# Patient Record
Sex: FEMALE | Race: WHITE | NOT HISPANIC OR LATINO
[De-identification: names, ages, dates, MRNs, and addresses within clinical notes are randomized per-mention and may not be internally consistent; named-entity substitution may affect disease eponyms.]

---

## 2023-09-24 ENCOUNTER — NON-APPOINTMENT (OUTPATIENT)
Age: 23
End: 2023-09-24

## 2024-04-09 PROBLEM — Z00.00 ENCOUNTER FOR PREVENTIVE HEALTH EXAMINATION: Status: ACTIVE | Noted: 2024-04-09

## 2024-04-16 ENCOUNTER — APPOINTMENT (OUTPATIENT)
Dept: COLORECTAL SURGERY | Facility: CLINIC | Age: 24
End: 2024-04-16
Payer: COMMERCIAL

## 2024-04-16 VITALS
HEIGHT: 65 IN | DIASTOLIC BLOOD PRESSURE: 69 MMHG | WEIGHT: 201.4 LBS | SYSTOLIC BLOOD PRESSURE: 109 MMHG | OXYGEN SATURATION: 98 % | HEART RATE: 91 BPM | BODY MASS INDEX: 33.55 KG/M2

## 2024-04-16 PROCEDURE — 99203 OFFICE O/P NEW LOW 30 MIN: CPT | Mod: 25

## 2024-04-16 PROCEDURE — 46600 DIAGNOSTIC ANOSCOPY SPX: CPT

## 2024-04-16 NOTE — ASSESSMENT
[FreeTextEntry1] : Perirectal abscess, spontaneously drained on own.  No signs of infection on current examination. First episode of perirectal abscess. Natural history reviewed with patient and possibility of associated anal fistula discussed. Signs/symptoms of abscess reviewed with patient. If occur, recommend patient call office for evaluation or go to nearest ED. Recommend sitz baths. Discussed further evaluation with MRI or EUA to assess for fistula if symptoms persist.  Recommend f/u 2-3 weeks to reassess, or sooner as needed.  All questions were answered, patient expressed understanding, and is agreeable to this plan.

## 2024-04-16 NOTE — PHYSICAL EXAM
[FreeTextEntry1] : Medical assistant present for duration of physical examination   General no acute distress, alert and oriented Psych calm, pleasant demeanor, responding appropriately to questions Nonlabored breathing Ambulating without assistance Skin normal color and pigment    Anorectal Exam: Inspection no erythema, induration or fluctuance, left anterior quadrant perianal wound with scant serous drainage,  no fissure, minimal external hemorrhoids without acute inflammation or thrombosis FAIZA nontender, no masses palpated, no blood on gloved finger, no fluctuance   Procedure: Anoscopy   Pre procedure Diagnosis: h/o perirectal abscess, rule out anal fistula Post procedure Diagnosis: h/o perirectal abscess, rule out anal fistula Anesthesia: none Estimated blood loss: none Specimen: none Complications: none   Consent obtained. Anoscopy was performed by passing a lighted anoscope with lubricant jelly into the anal canal and the entire anal mucosal surface was inspected. Findings included mild internal hemorrhoids, no blood or discharge in anal canal   Patient tolerated examination and procedure well.

## 2024-04-16 NOTE — HISTORY OF PRESENT ILLNESS
[FreeTextEntry1] : 22 y/o F presents for initial consultation  Referred by GI Dr. Janett Alvarado   Denies PMH Denies taking medications PSH - Ear tubes  Denies family hx of CRC/IBD  Allergies: PCN (rash)  No prior history of colonoscopy.  Outside records reviewed: Patient seen by GI on 04/09/2024 for c/o drainage and bleeding from L buttock.  Patient reported ~1 month ago had norovirus and menses after that passed developed painful bump in left buttock. Symptoms resolved until more recently had pain again and noticed bleeding. Exam noted: L perianal drainage around 3cm from the anal opening, evidence of prior ?hematoma/abscess. Referred to CRS for possible fistulous opening.   Reports lump on inside of left buttocks has resolved, but still feels a bit of firmness near the opening. Has daily bloody discharge noted on pad  Denies h/o fever, chills, abd pain, cramping or odorous, colored drainage Denies h/o abscesses elsewhere on body Moves bowels twice daily, soft/formed. Denies ASA/NSAID use in the last week

## 2024-04-30 ENCOUNTER — APPOINTMENT (OUTPATIENT)
Dept: COLORECTAL SURGERY | Facility: CLINIC | Age: 24
End: 2024-04-30
Payer: COMMERCIAL

## 2024-04-30 VITALS
WEIGHT: 206 LBS | HEART RATE: 76 BPM | DIASTOLIC BLOOD PRESSURE: 66 MMHG | BODY MASS INDEX: 34.32 KG/M2 | SYSTOLIC BLOOD PRESSURE: 112 MMHG | HEIGHT: 65 IN

## 2024-04-30 PROCEDURE — 99213 OFFICE O/P EST LOW 20 MIN: CPT

## 2024-04-30 NOTE — PHYSICAL EXAM
[FreeTextEntry1] : Medical assistant present for duration of physical examination   General no acute distress, alert and oriented Psych in good spirits, responding appropriately to questions Nonlabored breathing Ambulating without assistance Skin normal color and pigment    Anorectal Exam: Inspection no erythema, induration or fluctuance, left anterior quadrant open perianal wound with scant serous drainage, no fissure, minimal external hemorrhoids   Patient tolerated examination well.

## 2024-04-30 NOTE — HISTORY OF PRESENT ILLNESS
[FreeTextEntry1] : 24 y/o F presents for follow up  Referred by GI Dr. Janett Alvarado  Denies PMH Denies taking medications PSH - Ear tubes  Allergies: PCN (rash)  No prior history of colonoscopy. Denies family hx of CRC/IBD  Outside records reviewed: Patient seen by GI on 04/09/2024 for c/o drainage and bleeding from L buttock. Patient reported ~1 month ago had norovirus and menses after that passed developed painful bump in left buttock. Symptoms resolved until more recently had pain again and noticed bleeding. Exam noted: L perianal drainage around 3cm from the anal opening, evidence of prior ?hematoma/abscess. Referred to CRS for possible fistulous opening.  Patient seen for initial visit on 04/16/2024 Exam including anoscopy noted no erythema, induration or fluctuance, left anterior quadrant perianal wound with scant serous drainage, mild internal hemorrhoids. Recommended f/u in 2-3 weeks. Consideration of MRI or EUA if symptoms persist to further assess for fistula.   Denies pain but does not feel like wound has healed. She states the day of her last visit, she had purulent discharge that night and felt chills, resolved on own by the morning. Did not take temperature at that time. Still notes a clear liquid from the wound, sometimes blood tinged.  Daily BM, no diarrhea or constipation, no BRB in toilet bowl.  Did a few warm baths, but then stopped when menstrual cycle started. Using bidet to keep area clean.

## 2024-04-30 NOTE — ASSESSMENT
[FreeTextEntry1] : Continue sitz baths. Discussed further evaluation with MRI or EUA +/- seton placement to assess for anal fistula. All questions answered, patient would like to proceed with MRI pelvis and follow up after imaging to discuss results and further treatment plan/options.

## 2024-05-07 ENCOUNTER — OUTPATIENT (OUTPATIENT)
Dept: OUTPATIENT SERVICES | Facility: HOSPITAL | Age: 24
LOS: 1 days | End: 2024-05-07

## 2024-05-07 ENCOUNTER — APPOINTMENT (OUTPATIENT)
Dept: MRI IMAGING | Facility: CLINIC | Age: 24
End: 2024-05-07
Payer: COMMERCIAL

## 2024-05-07 PROCEDURE — 72197 MRI PELVIS W/O & W/DYE: CPT | Mod: 26

## 2024-05-15 ENCOUNTER — NON-APPOINTMENT (OUTPATIENT)
Age: 24
End: 2024-05-15

## 2024-05-19 ENCOUNTER — TRANSCRIPTION ENCOUNTER (OUTPATIENT)
Age: 24
End: 2024-05-19

## 2024-05-23 ENCOUNTER — APPOINTMENT (OUTPATIENT)
Dept: COLORECTAL SURGERY | Facility: CLINIC | Age: 24
End: 2024-05-23
Payer: COMMERCIAL

## 2024-05-23 VITALS
SYSTOLIC BLOOD PRESSURE: 123 MMHG | WEIGHT: 204 LBS | TEMPERATURE: 98.2 F | DIASTOLIC BLOOD PRESSURE: 77 MMHG | BODY MASS INDEX: 33.99 KG/M2 | HEART RATE: 93 BPM | HEIGHT: 65 IN

## 2024-05-23 DIAGNOSIS — K60.3 ANAL FISTULA: ICD-10-CM

## 2024-05-23 PROCEDURE — 99215 OFFICE O/P EST HI 40 MIN: CPT

## 2024-05-23 NOTE — HISTORY OF PRESENT ILLNESS
[FreeTextEntry1] : 25 y/o F presents for follow up  Referred by GI Dr. Janett Alvarado  Denies PMH Denies taking medications PSH - Ear tubes  Allergies: PCN (rash)  No prior history of colonoscopy. Denies family hx of CRC/IBD  Outside records reviewed: Patient seen by GI on 04/09/2024 for c/o drainage and bleeding from L buttock. Patient reported ~1 month ago had norovirus and menses after that passed developed painful bump in left buttock. Symptoms resolved until more recently had pain again and noticed bleeding. Exam noted: L perianal drainage around 3cm from the anal opening, evidence of prior ?hematoma/abscess. Referred to CRS for possible fistulous opening.  Patient seen for initial visit on 04/16/2024 Exam including anoscopy noted no erythema, induration or fluctuance, left anterior quadrant perianal wound with scant serous drainage, mild internal hemorrhoids. Recommended f/u in 2-3 weeks. Consideration of MRI or EUA if symptoms persist to further assess for fistula.  Last seen 04/30/24 for follow up, reporting continued drainage from wound and feels like it has not healed. Exam noted left anterior quadrant open perianal wound with scant serous drainage, no fissure, minimal external hemorrhoids. Discussed further evaluation with MRI or EUA +/- seton placement to assess for anal fistula. Patient agreed to proceed with MRI.   EXAM: [id number] - MR PELVIS WAW IC  - ORDERED BY: JACKI العلي PROCEDURE DATE:  05/07/2024  FINDINGS: Fluid-filled peripherally enhancing nonbranching 3.5 cm long tract from level of skin at left inner gluteal fold extends in cephalad direction to rim-enhancing fluid pocket just below level of anal verge measuring 1.0 x 0.6 x 1.5 cm (6:30, 4:23), extends further cephalad and at level of anal verge, there is a suspected mucosal defect at the left anterior quadrant.  REPRODUCTIVE ORGANS: Within normal limits. BLADDER: Within normal limits. LYMPH NODES: No pelvic lymphadenopathy. VISUALIZED PORTIONS: PERITONEUM: No ascites. VESSELS: Within normal limits. ABDOMINAL WALL: Within normal limits. BONES: Within normal limits.  IMPRESSION: Findings compatible with left-sided perianal abscess and active sinus tract, with suspected left anterior quadrant mucosal opening/fistula. --- End of Report ---  Patient returns today for follow up Patient reports continues with slight bloody discharge from anal area if she disrupts area by wiping. Besides that states she doesn't notice much drainage. Reports feels like wound is not healing. Denies any pain, fever or chills. Continues with warm sitz bath about 1x a day.

## 2024-05-23 NOTE — ASSESSMENT
[FreeTextEntry1] : Exam findings, MRI results, diagnosis, and treatment options were discussed at length with patient, and with her mother on speaker phone at patient's request.  Recommend EUA, possible fistulotomy, possible seton placement. The nature of the procedure as well as risks and benefits were reviewed with the patient. Risk/benefits/alternatives discussed at length, including but not limited to pain, bleeding, infection, swelling, recurrence of symptoms, need for future surgery, scarring, and risk of alteration of bowel habits, such as seepage, fecal urgency and/or fecal (gas, liquid or solid) incontinence discussed, which may be temporary or permanent.  Postprocedural expectations regarding pain, wound cosmesis, and wound healing was discussed.   Success and failure rates of interventions also discussed. The preoperative, intraoperative, and postoperative management were discussed with the patient in detail. All questions were answered, patient expressed understanding, and would like to proceed with the proposed surgical plan. Informed consent was obtained. Ambulatory surgery instructions were given to patient

## 2024-05-28 ENCOUNTER — TRANSCRIPTION ENCOUNTER (OUTPATIENT)
Age: 24
End: 2024-05-28

## 2024-05-31 ENCOUNTER — APPOINTMENT (OUTPATIENT)
Dept: INTERNAL MEDICINE | Facility: CLINIC | Age: 24
End: 2024-05-31
Payer: COMMERCIAL

## 2024-05-31 ENCOUNTER — NON-APPOINTMENT (OUTPATIENT)
Age: 24
End: 2024-05-31

## 2024-05-31 VITALS
BODY MASS INDEX: 33.99 KG/M2 | WEIGHT: 204 LBS | TEMPERATURE: 98.6 F | HEART RATE: 90 BPM | OXYGEN SATURATION: 98 % | RESPIRATION RATE: 16 BRPM | HEIGHT: 65 IN | SYSTOLIC BLOOD PRESSURE: 113 MMHG | DIASTOLIC BLOOD PRESSURE: 81 MMHG

## 2024-05-31 DIAGNOSIS — K61.1 RECTAL ABSCESS: ICD-10-CM

## 2024-05-31 DIAGNOSIS — E66.9 OBESITY, UNSPECIFIED: ICD-10-CM

## 2024-05-31 DIAGNOSIS — Z80.7 FAMILY HISTORY OF OTHER MALIGNANT NEOPLASMS OF LYMPHOID, HEMATOPOIETIC AND RELATED TISSUES: ICD-10-CM

## 2024-05-31 DIAGNOSIS — Z01.818 ENCOUNTER FOR OTHER PREPROCEDURAL EXAMINATION: ICD-10-CM

## 2024-05-31 DIAGNOSIS — F12.90 CANNABIS USE, UNSPECIFIED, UNCOMPLICATED: ICD-10-CM

## 2024-05-31 DIAGNOSIS — Z83.49 FAMILY HISTORY OF OTHER ENDOCRINE, NUTRITIONAL AND METABOLIC DISEASES: ICD-10-CM

## 2024-05-31 DIAGNOSIS — Z78.9 OTHER SPECIFIED HEALTH STATUS: ICD-10-CM

## 2024-05-31 DIAGNOSIS — J18.9 PNEUMONIA, UNSPECIFIED ORGANISM: ICD-10-CM

## 2024-05-31 PROCEDURE — 93000 ELECTROCARDIOGRAM COMPLETE: CPT

## 2024-05-31 PROCEDURE — 99203 OFFICE O/P NEW LOW 30 MIN: CPT

## 2024-05-31 PROCEDURE — 36415 COLL VENOUS BLD VENIPUNCTURE: CPT

## 2024-06-01 LAB
ALBUMIN SERPL ELPH-MCNC: 4.7 G/DL
ALP BLD-CCNC: 87 U/L
ALT SERPL-CCNC: 20 U/L
ANION GAP SERPL CALC-SCNC: 12 MMOL/L
AST SERPL-CCNC: 14 U/L
BASOPHILS # BLD AUTO: 0.08 K/UL
BASOPHILS NFR BLD AUTO: 0.8 %
BILIRUB SERPL-MCNC: 0.2 MG/DL
BUN SERPL-MCNC: 11 MG/DL
CALCIUM SERPL-MCNC: 10 MG/DL
CHLORIDE SERPL-SCNC: 104 MMOL/L
CO2 SERPL-SCNC: 21 MMOL/L
CREAT SERPL-MCNC: 0.73 MG/DL
EGFR: 117 ML/MIN/1.73M2
EOSINOPHIL # BLD AUTO: 0.08 K/UL
EOSINOPHIL NFR BLD AUTO: 0.8 %
GLUCOSE SERPL-MCNC: 98 MG/DL
HCG SERPL-MCNC: <1 MIU/ML
HCT VFR BLD CALC: 36.3 %
HGB BLD-MCNC: 12.2 G/DL
IMM GRANULOCYTES NFR BLD AUTO: 1.2 %
LYMPHOCYTES # BLD AUTO: 2.44 K/UL
LYMPHOCYTES NFR BLD AUTO: 24.8 %
MAN DIFF?: NORMAL
MCHC RBC-ENTMCNC: 30.1 PG
MCHC RBC-ENTMCNC: 33.6 GM/DL
MCV RBC AUTO: 89.6 FL
MONOCYTES # BLD AUTO: 0.6 K/UL
MONOCYTES NFR BLD AUTO: 6.1 %
NEUTROPHILS # BLD AUTO: 6.5 K/UL
NEUTROPHILS NFR BLD AUTO: 66.3 %
PLATELET # BLD AUTO: 364 K/UL
POTASSIUM SERPL-SCNC: 4.3 MMOL/L
PROT SERPL-MCNC: 7.3 G/DL
RBC # BLD: 4.05 M/UL
RBC # FLD: 13.2 %
SODIUM SERPL-SCNC: 137 MMOL/L
WBC # FLD AUTO: 9.82 K/UL

## 2024-06-01 NOTE — HISTORY OF PRESENT ILLNESS
[No Pertinent Cardiac History] : no history of aortic stenosis, atrial fibrillation, coronary artery disease, recent myocardial infarction, or implantable device/pacemaker [No Pertinent Pulmonary History] : no history of asthma, COPD, sleep apnea, or smoking [(Patient denies any chest pain, claudication, dyspnea on exertion, orthopnea, palpitations or syncope)] : Patient denies any chest pain, claudication, dyspnea on exertion, orthopnea, palpitations or syncope [Moderate (4-6 METs)] : Moderate (4-6 METs) [Family Member] : no family member with adverse anesthesia reaction/sudden death [Self] : no previous adverse anesthesia reaction [Chronic Anticoagulation] : no chronic anticoagulation [Chronic Kidney Disease] : no chronic kidney disease [Diabetes] : no diabetes [FreeTextEntry2] : 6/21/24 [FreeTextEntry1] : EUA, possible fistulotomy/seton placement [FreeTextEntry3] : Dr. Mallory  [FreeTextEntry4] : Ms. CATHY BENSON is a 24-year-old female with history of perianal abscess and fistula presenting today to the [facility] prior to fistulotomy.  [FreeTextEntry8] : lives in 5th floor walk up

## 2024-06-01 NOTE — ASSESSMENT
[High Risk Surgery - Intraperitoneal, Intrathoracic or Supringuinal Vascular Procedures] : High Risk Surgery - Intraperitoneal, Intrathoracic or Supringuinal Vascular Procedures - No (0) [Ischemic Heart Disease] : Ischemic Heart Disease - No (0) [Congestive Heart Failure] : Congestive Heart Failure - No (0) [Prior Cerebrovascular Accident or TIA] : Prior Cerebrovascular Accident or TIA - No (0) [Creatinine >= 2mg/dL (1 Point)] : Creatinine >= 2mg/dL - No (0) [Insulin-dependent Diabetic (1 Point)] : Insulin-dependent Diabetic - No (0) [0] : 0 , RCRI Class: I, Risk of Post-Op Cardiac Complications: 3.9%, 95% CI for Risk Estimate: 2.8% - 5.4% [Patient Optimized for Surgery] : Patient optimized for surgery [FreeTextEntry4] : Ms. CATHY BENSON is a 24-year-old female with history of perianal abscess and fistula presenting today to the [facility] prior to fistulotomy. Able to achieve >4 METs, ECG without ischemic changes and no anginal symptoms. Labs unremarkable. Low risk for a low risk procedure.

## 2024-06-03 ENCOUNTER — TRANSCRIPTION ENCOUNTER (OUTPATIENT)
Age: 24
End: 2024-06-03

## 2024-06-20 ENCOUNTER — TRANSCRIPTION ENCOUNTER (OUTPATIENT)
Age: 24
End: 2024-06-20

## 2024-06-20 NOTE — ASU PATIENT PROFILE, ADULT - NS PREOP UNDERSTANDS INFO
nothing to eat from midnight , wear loose comfort clothes. bring photo id ,insurance, credit cards. No food from midnight , clears till 05:00 AM . No jewelry no valuables , must have an escort./yes

## 2024-06-20 NOTE — ASU PATIENT PROFILE, ADULT - FALL HARM RISK - UNIVERSAL INTERVENTIONS
Bed in lowest position, wheels locked, appropriate side rails in place/Call bell, personal items and telephone in reach/Instruct patient to call for assistance before getting out of bed or chair/Non-slip footwear when patient is out of bed/[place] to call system/Physically safe environment - no spills, clutter or unnecessary equipment/Purposeful Proactive Rounding/Room/bathroom lighting operational, light cord in reach

## 2024-06-20 NOTE — ASU PATIENT PROFILE, ADULT - NSICDXPASTMEDICALHX_GEN_ALL_CORE_FT
PAST MEDICAL HISTORY:  No pertinent past medical history      PAST MEDICAL HISTORY:  Anal fistula     Perirectal abscess

## 2024-06-21 ENCOUNTER — APPOINTMENT (OUTPATIENT)
Dept: COLORECTAL SURGERY | Facility: CLINIC | Age: 24
End: 2024-06-21

## 2024-06-21 ENCOUNTER — RESULT REVIEW (OUTPATIENT)
Age: 24
End: 2024-06-21

## 2024-06-21 ENCOUNTER — OUTPATIENT (OUTPATIENT)
Dept: OUTPATIENT SERVICES | Facility: HOSPITAL | Age: 24
LOS: 1 days | Discharge: HOME | End: 2024-06-21
Payer: COMMERCIAL

## 2024-06-21 ENCOUNTER — TRANSCRIPTION ENCOUNTER (OUTPATIENT)
Age: 24
End: 2024-06-21

## 2024-06-21 VITALS
RESPIRATION RATE: 16 BRPM | HEART RATE: 69 BPM | DIASTOLIC BLOOD PRESSURE: 64 MMHG | TEMPERATURE: 97 F | SYSTOLIC BLOOD PRESSURE: 108 MMHG

## 2024-06-21 VITALS
HEART RATE: 80 BPM | OXYGEN SATURATION: 98 % | HEIGHT: 65 IN | RESPIRATION RATE: 16 BRPM | DIASTOLIC BLOOD PRESSURE: 77 MMHG | WEIGHT: 201.72 LBS | SYSTOLIC BLOOD PRESSURE: 101 MMHG | TEMPERATURE: 99 F

## 2024-06-21 DIAGNOSIS — Z96.22 MYRINGOTOMY TUBE(S) STATUS: Chronic | ICD-10-CM

## 2024-06-21 PROCEDURE — 46020 PLACEMENT OF SETON: CPT | Mod: GC

## 2024-06-21 PROCEDURE — 88304 TISSUE EXAM BY PATHOLOGIST: CPT | Mod: 26

## 2024-06-21 DEVICE — SURGCEL 4 X 8": Type: IMPLANTABLE DEVICE | Status: FUNCTIONAL

## 2024-06-21 RX ORDER — ACETAMINOPHEN 500 MG
1000 TABLET ORAL ONCE
Refills: 0 | Status: DISCONTINUED | OUTPATIENT
Start: 2024-06-21 | End: 2024-06-21

## 2024-06-21 RX ORDER — DOCUSATE SODIUM 100 MG
1 CAPSULE ORAL
Qty: 30 | Refills: 0
Start: 2024-06-21 | End: 2024-07-20

## 2024-06-21 RX ORDER — SODIUM CHLORIDE 9 MG/ML
1000 INJECTION, SOLUTION INTRAVENOUS
Refills: 0 | Status: DISCONTINUED | OUTPATIENT
Start: 2024-06-21 | End: 2024-06-21

## 2024-06-21 RX ORDER — OXYCODONE AND ACETAMINOPHEN 5; 325 MG/1; MG/1
1 TABLET ORAL
Qty: 15 | Refills: 0
Start: 2024-06-21

## 2024-06-21 NOTE — ASU DISCHARGE PLAN (ADULT/PEDIATRIC) - CARE PROVIDER_API CALL
Roslyn Mallory  Surgery  [facility] [facility]  [place] [address]  Phone: [phone]  Fax: [phone]  Follow Up Time:

## 2024-06-21 NOTE — ASU DISCHARGE PLAN (ADULT/PEDIATRIC) - NS MD DC FALL RISK RISK
For information on Fall & Injury Prevention, visit: [email or web address] OR  [email or web address] OR  https://www.cdc.gov/steadi/patient.html

## 2024-06-21 NOTE — ASU DISCHARGE PLAN (ADULT/PEDIATRIC) - ASU DC SPECIAL INSTRUCTIONSFT
Please take Percocet as prescribed for SEVERE pain.  Please call the office to schedule a follow up appointment with Dr. Mallory in 1 month. Please take Percocet as prescribed for SEVERE pain.  Please call the office to schedule a follow up appointment with Dr. Mallory in 2-3 weeks.  Please follow the attached discharge instructions.

## 2024-06-21 NOTE — BRIEF OPERATIVE NOTE - OPERATION/FINDINGS
Patient placed in prone jackknife position, prepped and draped in sterile fashion, fistula tract probed, identified with hydrogen peroxide. Outer portion of tract cauterized, noted to involve a portion of muscle, seton placed through tract. Pt transported to PACU in stable fashion.  Patient placed in prone jackknife position, prepped and draped in sterile fashion, fistula tract probed, identified with hydrogen peroxide. External opening incised in subcutaneous portion of tract, fistula content examined amd noted to be transsphinteric. seton placed through tract. Pt transported to PACU in stable fashion.

## 2024-06-25 PROBLEM — K61.1 RECTAL ABSCESS: Chronic | Status: ACTIVE | Noted: 2024-06-21

## 2024-06-25 PROBLEM — K60.3 ANAL FISTULA: Chronic | Status: ACTIVE | Noted: 2024-06-21

## 2024-06-26 ENCOUNTER — TRANSCRIPTION ENCOUNTER (OUTPATIENT)
Age: 24
End: 2024-06-26

## 2024-06-27 ENCOUNTER — APPOINTMENT (OUTPATIENT)
Dept: COLORECTAL SURGERY | Facility: CLINIC | Age: 24
End: 2024-06-27
Payer: COMMERCIAL

## 2024-06-27 PROCEDURE — 99214 OFFICE O/P EST MOD 30 MIN: CPT

## 2024-07-02 LAB — SURGICAL PATHOLOGY STUDY: SIGNIFICANT CHANGE UP

## 2024-07-03 ENCOUNTER — TRANSCRIPTION ENCOUNTER (OUTPATIENT)
Age: 24
End: 2024-07-03

## 2024-07-08 ENCOUNTER — TRANSCRIPTION ENCOUNTER (OUTPATIENT)
Age: 24
End: 2024-07-08

## 2024-07-22 ENCOUNTER — APPOINTMENT (OUTPATIENT)
Dept: COLORECTAL SURGERY | Facility: CLINIC | Age: 24
End: 2024-07-22
Payer: COMMERCIAL

## 2024-07-22 VITALS
BODY MASS INDEX: 33.99 KG/M2 | HEIGHT: 65 IN | OXYGEN SATURATION: 97 % | SYSTOLIC BLOOD PRESSURE: 109 MMHG | WEIGHT: 204 LBS | HEART RATE: 76 BPM | RESPIRATION RATE: 16 BRPM | DIASTOLIC BLOOD PRESSURE: 68 MMHG

## 2024-07-22 DIAGNOSIS — K60.3 ANAL FISTULA: ICD-10-CM

## 2024-07-22 PROCEDURE — 99214 OFFICE O/P EST MOD 30 MIN: CPT

## 2024-07-22 NOTE — PHYSICAL EXAM
[FreeTextEntry1] : Medical assistant present for duration of physical examination   General no acute distress, alert and oriented Psych pleasant, responding appropriately to questions Nonlabored breathing Ambulating without assistance Skin normal color and pigment    Anorectal Exam: Inspection no erythema, induration or fluctuance, left anterior seton intact and in place, no expressible discharge or drainage, no surrounding cellulitis   Patient tolerated examination well.

## 2024-07-22 NOTE — ASSESSMENT
[FreeTextEntry1] : Exam findings, diagnosis, and further management options were discussed at length with patient and her mother, including treatment recommendations according to current American Society of Colon and Rectal Surgeons Clinical Practice Guidelines for the Management of Anorectal Abscess, Fistula-in-Ano, and Rectovaginal Fistula (2022). The patient states she is considering anal fistula repair and rectal advancement flap as next step. The nature of the procedure as well as risks and benefits were reviewed with them. Risk/benefits/alternatives discussed at length, including but not limited to pain, bleeding, infection, swelling, recurrence of symptoms, need for future surgery, scarring, and risk of alteration of bowel habits, such as seepage, fecal urgency and/or fecal (gas, liquid or solid) incontinence discussed, which may be temporary or permanent.  Postprocedural expectations regarding pain, wound cosmesis, and wound healing was discussed.   Failure rates and success rates of intervention discussed.  The preoperative, intraoperative, and postoperative management were discussed with the patient in detail. All questions were answered, patient expressed understanding. They would like more time to discuss amongst themselves and their family members. They also expressed interest in seeking additional opinions. Our team will follow up with the patient as they make their decision.

## 2024-07-22 NOTE — HISTORY OF PRESENT ILLNESS
[FreeTextEntry1] : 23 yo Female presents for follow up  Denies PMH Denies taking medications PSH - Ear tubes Allergies: PCN (rash)  GI Dr. Janett Alvarado No prior history of colonoscopy. Denies family hx of CRC/IBD  Outside records reviewed: Patient seen by GI on 04/09/2024 for c/o drainage and bleeding from L buttock. Patient reported ~1 month ago had norovirus and menses after that passed developed painful bump in left buttock. Symptoms resolved until more recently had pain again and noticed bleeding. Exam noted: L perianal drainage around 3cm from the anal opening, evidence of prior ?hematoma/abscess. Referred to CRS for possible fistulous opening.  Patient seen for initial visit on 04/16/2024 Exam including anoscopy noted no erythema, induration or fluctuance, left anterior quadrant perianal wound with scant serous drainage, mild internal hemorrhoids. Recommended f/u in 2-3 weeks. Consideration of MRI or EUA if symptoms persist to further assess for fistula.  Last seen 04/30/24 for follow up, reporting continued drainage from wound and feels like it has not healed. Exam noted left anterior quadrant open perianal wound with scant serous drainage, no fissure, minimal external hemorrhoids. Discussed further evaluation with MRI or EUA +/- seton placement to assess for anal fistula. Patient agreed to proceed with MRI.  EXAM: [id number] - MR PELVIS WAW IC - ORDERED BY: JACKI العلي PROCEDURE DATE: 05/07/2024 IMPRESSION: Findings compatible with left-sided perianal abscess and active sinus tract, with suspected left anterior quadrant mucosal opening/fistula.  s/p Anorectal exam under anesthesia with seton placement 6/21/24  Postop telehealth visit was held with patient and her parents 6/27/24 discussing EUA findings, and further fistula management options. Discussed at length further management options for transsphincteric anal fistula, including cutting seton and anal fistula repair (rectal advancement flap given location). Failure rates of intervention discussed. Also discussed with patient and parents potential for enrollment in clinical trial - [employer] RD2 Elana.02 Protocol  A Prospective, Multi-Center, Double Blind, Randomized Controlled Study, Evaluating the Safety and efficacy of RD2 Elana.02, For the Management of Anal Fistulas.  Returns today for office visit with her mother.  Denies pain or fevers. Reports some discomfort due to chaffing, using Calmoseptine cream to help soothe area. Daily BMs, no changes from prior Doing sitz baths.

## (undated) DEVICE — DRAPE TOWEL BLUE 17" X 24"

## (undated) DEVICE — SYR LUER LOK 10CC

## (undated) DEVICE — SYR ASEPTO

## (undated) DEVICE — VESSEL LOOP MAXI-WHITE 0.120" X 16"

## (undated) DEVICE — PACK COLO RECTAL

## (undated) DEVICE — SUT VICRYL PLUS 3-0 27" SH UNDYED

## (undated) DEVICE — SUT VICRYL PLUS 2-0 27" UR-6

## (undated) DEVICE — POSITIONER STRAP KNEE & BODY 3X60" DISP

## (undated) DEVICE — VESSEL LOOP MAXI-BLUE 0.120" X 16"

## (undated) DEVICE — ELCTR STRYKER BLADE COATED INSULATED 165MM

## (undated) DEVICE — LUBRICATING JELLY ONESHOT 1.25OZ

## (undated) DEVICE — VENODYNE/SCD SLEEVE CALF MEDIUM

## (undated) DEVICE — GLV 7 PROTEXIS (WHITE)

## (undated) DEVICE — DRAPE MEDIUM SHEET 44" X 70"

## (undated) DEVICE — Device

## (undated) DEVICE — TAPE SILK 3"

## (undated) DEVICE — POSITIONER FOAM EGG CRATE ULNAR 2PCS (PINK)

## (undated) DEVICE — SPECIMEN CONTAINER 4OZ

## (undated) DEVICE — SUT SILK 0 30" SH

## (undated) DEVICE — DRAPE 1/2 SHEET 40X57"

## (undated) DEVICE — WARMING BLANKET UPPER ADULT

## (undated) DEVICE — GOWN ROYAL SILK XL

## (undated) DEVICE — ELCTR BOVIE TIP BLADE INSULATED 2.8" EDGE WITH SAFETY

## (undated) DEVICE — GLV 6.5 PROTEXIS (WHITE)

## (undated) DEVICE — DRSG TELFA 3 X 8

## (undated) DEVICE — PLASTIC SOLUTION BOWL 160Z

## (undated) DEVICE — SYR LUER LOK 50CC

## (undated) DEVICE — DRSG CURITY GAUZE SPONGE 4 X 8" 12-PLY NON-STERILE

## (undated) DEVICE — PREP BETADINE KIT

## (undated) DEVICE — ELCTR PENCIL SMOKE EVACUATOR COATED PUSH BUTTON 70MM